# Patient Record
Sex: FEMALE | Race: WHITE | URBAN - METROPOLITAN AREA
[De-identification: names, ages, dates, MRNs, and addresses within clinical notes are randomized per-mention and may not be internally consistent; named-entity substitution may affect disease eponyms.]

---

## 2019-06-04 ENCOUNTER — NEW PATIENT (OUTPATIENT)
Dept: URBAN - METROPOLITAN AREA CLINIC 30 | Facility: CLINIC | Age: 79
End: 2019-06-04
Payer: MEDICARE

## 2019-06-04 DIAGNOSIS — M06.9 RHEUMATOID ARTHRITIS, UNSPECIFIED: Primary | ICD-10-CM

## 2019-06-04 PROCEDURE — 92134 CPTRZ OPH DX IMG PST SGM RTA: CPT | Performed by: OPTOMETRIST

## 2019-06-04 PROCEDURE — 92004 COMPRE OPH EXAM NEW PT 1/>: CPT | Performed by: OPTOMETRIST

## 2019-06-04 RX ORDER — CYCLOSPORINE 0.5 MG/ML
0.05 % EMULSION OPHTHALMIC
Qty: 60 | Refills: 5 | Status: INACTIVE
Start: 2019-06-04 | End: 2020-07-17

## 2019-06-04 ASSESSMENT — INTRAOCULAR PRESSURE
OS: 10
OD: 10

## 2019-06-04 ASSESSMENT — VISUAL ACUITY
OS: 20/25
OD: 20/20

## 2019-06-04 ASSESSMENT — KERATOMETRY
OS: 43.95
OD: 43.92

## 2020-10-28 ENCOUNTER — FOLLOW UP ESTABLISHED (OUTPATIENT)
Dept: URBAN - METROPOLITAN AREA CLINIC 30 | Facility: CLINIC | Age: 80
End: 2020-10-28
Payer: MEDICARE

## 2020-10-28 PROCEDURE — 92014 COMPRE OPH EXAM EST PT 1/>: CPT | Performed by: OPTOMETRIST

## 2020-10-28 PROCEDURE — 92083 EXTENDED VISUAL FIELD XM: CPT | Performed by: OPTOMETRIST

## 2020-10-28 PROCEDURE — 92134 CPTRZ OPH DX IMG PST SGM RTA: CPT | Performed by: OPTOMETRIST

## 2020-10-28 ASSESSMENT — INTRAOCULAR PRESSURE
OD: 11
OS: 10

## 2020-10-28 ASSESSMENT — VISUAL ACUITY
OD: 20/20
OS: 20/25

## 2020-10-28 ASSESSMENT — KERATOMETRY
OS: 43.98
OD: 43.95

## 2021-12-27 ENCOUNTER — OFFICE VISIT (OUTPATIENT)
Dept: URBAN - METROPOLITAN AREA CLINIC 30 | Facility: CLINIC | Age: 81
End: 2021-12-27
Payer: MEDICARE

## 2021-12-27 DIAGNOSIS — H35.3131 BILATERAL NONEXUDATIVE AGE-RELATED MACULAR DEGENERATION, EARLY DRY STAGE: ICD-10-CM

## 2021-12-27 DIAGNOSIS — G35 MULTIPLE SCLEROSIS: ICD-10-CM

## 2021-12-27 DIAGNOSIS — H16.223 KERATOCONJUNCTIVITIS SICCA, NOT SPECIFIED AS SJOGREN'S, BILATERAL: ICD-10-CM

## 2021-12-27 DIAGNOSIS — Z96.1 PRESENCE OF INTRAOCULAR LENS: ICD-10-CM

## 2021-12-27 DIAGNOSIS — H43.813 VITREOUS DEGENERATION, BILATERAL: ICD-10-CM

## 2021-12-27 DIAGNOSIS — Z79.899 OTHER LONG TERM (CURRENT) DRUG THERAPY: Primary | ICD-10-CM

## 2021-12-27 PROCEDURE — 92014 COMPRE OPH EXAM EST PT 1/>: CPT | Performed by: OPTOMETRIST

## 2021-12-27 PROCEDURE — 92134 CPTRZ OPH DX IMG PST SGM RTA: CPT | Performed by: OPTOMETRIST

## 2021-12-27 PROCEDURE — 92083 EXTENDED VISUAL FIELD XM: CPT | Performed by: OPTOMETRIST

## 2021-12-27 ASSESSMENT — KERATOMETRY
OD: 43.81
OS: 43.95

## 2021-12-27 ASSESSMENT — INTRAOCULAR PRESSURE
OS: 11
OD: 11

## 2021-12-27 ASSESSMENT — VISUAL ACUITY
OS: 20/20
OD: 20/25

## 2021-12-27 NOTE — IMPRESSION/PLAN
Impression: Keratoconjunctivitis sicca, bilateral Plan: On Restasis since 2017. Has been helpful per pt, she is comfortable using Restasis BID OU. Does not use ATs. Restasis Rx renewed today.

## 2021-12-27 NOTE — IMPRESSION/PLAN
Impression: Bilateral nonexudative age-related macular degeneration, early dry stage: H35.3131. Denies Fam Hx Plan: Pt educ on findings. Rec AREDs 2 vitamins, sun protection, leafy greens, etc. Non smoker-never. No signs of exudation on exam or OCT. Pt instructed to contact us immediately if notices decline in vision.

## 2021-12-27 NOTE — IMPRESSION/PLAN
Impression: Other long term (current) drug therapy Plan: Plaquenil 200 mg BID QD PO  since 2017 for RA. Moved from [place] 2019. MAC OCT 6/19-mild outer retinal degen, serves as baseline. MAC 10/20 no toxicity, stable RPE mottling OU. MAC OCT 12/21 no apparent toxicity (mild dry AMD) 10-2 VF 10/20 essentially full OU. VF 12/21 scattered defects OU (pt notes she was tired during the test)-may be impacted by AMD
NO toxicity,- will repeat in 3 months. Monitor annually.

## 2021-12-27 NOTE — IMPRESSION/PLAN
Impression: Multiple sclerosis Plan: Diagnosed 2002. On Copaxone. Notes h/o diplopia at onset (triple vision). No diplopia or triple vision since was diagnosed and tx was started. RNFL 6/2019 OD) normal NFL OD) borderline NFL.

## 2021-12-27 NOTE — IMPRESSION/PLAN
Impression: Vitreous degeneration, bilateral Plan: Retinas flat and attached OU. Reviewed signs and symptoms of RD and to call asap if occurs.

## 2022-03-30 ENCOUNTER — OFFICE VISIT (OUTPATIENT)
Dept: URBAN - METROPOLITAN AREA CLINIC 30 | Facility: CLINIC | Age: 82
End: 2022-03-30
Payer: MEDICARE

## 2022-03-30 PROCEDURE — 99213 OFFICE O/P EST LOW 20 MIN: CPT | Performed by: OPTOMETRIST

## 2022-03-30 PROCEDURE — 92083 EXTENDED VISUAL FIELD XM: CPT | Performed by: OPTOMETRIST

## 2022-03-30 RX ORDER — CYCLOSPORINE 0.5 MG/ML
0.05 % EMULSION OPHTHALMIC
Qty: 60 | Refills: 5 | Status: INACTIVE
Start: 2022-03-30 | End: 2022-03-30

## 2022-03-30 RX ORDER — CYCLOSPORINE 0.5 MG/ML
0.05 % EMULSION OPHTHALMIC
Qty: 60 | Refills: 5 | Status: ACTIVE
Start: 2022-03-30

## 2022-03-30 ASSESSMENT — INTRAOCULAR PRESSURE
OS: 14
OD: 13

## 2022-03-30 NOTE — IMPRESSION/PLAN
Impression: Other long term (current) drug therapy Plan: Plaquenil 200 mg BID QD PO  since 2017 for RA. Moved from [place] 2019. MAC OCT 6/19-mild outer retinal degen, serves as baseline. MAC 10/20 no toxicity, stable RPE mottling OU. MAC OCT 12/21 no apparent toxicity (mild dry AMD) 10-2 VF 10/20 essentially full OU. VF 12/21 scattered defects OU (pt notes she was tired during the test)-may be impacted by AMD
NO toxicity. VF 3/2022: essentially full OU. Monitor annually.

## 2022-03-30 NOTE — IMPRESSION/PLAN
Impression: Keratoconjunctivitis sicca, bilateral Plan: On Restasis since 2017. Has been helpful per pt, she is comfortable using Restasis BID OU. Does not use ATs. Restasis Rx renewed again today.

## 2023-02-02 ENCOUNTER — OFFICE VISIT (OUTPATIENT)
Dept: URBAN - METROPOLITAN AREA CLINIC 30 | Facility: CLINIC | Age: 83
End: 2023-02-02
Payer: COMMERCIAL

## 2023-02-02 DIAGNOSIS — Z96.1 PRESENCE OF INTRAOCULAR LENS: ICD-10-CM

## 2023-02-02 DIAGNOSIS — H35.3131 BILATERAL NONEXUDATIVE AGE-RELATED MACULAR DEGENERATION, EARLY DRY STAGE: ICD-10-CM

## 2023-02-02 DIAGNOSIS — Z79.899 OTHER LONG TERM (CURRENT) DRUG THERAPY: Primary | ICD-10-CM

## 2023-02-02 DIAGNOSIS — H43.813 VITREOUS DEGENERATION, BILATERAL: ICD-10-CM

## 2023-02-02 DIAGNOSIS — G35 MULTIPLE SCLEROSIS: ICD-10-CM

## 2023-02-02 DIAGNOSIS — H16.223 KERATOCONJUNCTIVITIS SICCA, NOT SPECIFIED AS SJOGREN'S, BILATERAL: ICD-10-CM

## 2023-02-02 PROCEDURE — 99213 OFFICE O/P EST LOW 20 MIN: CPT | Performed by: OPTOMETRIST

## 2023-02-02 PROCEDURE — 92083 EXTENDED VISUAL FIELD XM: CPT | Performed by: OPTOMETRIST

## 2023-02-02 PROCEDURE — 92134 CPTRZ OPH DX IMG PST SGM RTA: CPT | Performed by: OPTOMETRIST

## 2023-02-02 ASSESSMENT — KERATOMETRY
OS: 43.87
OD: 43.77

## 2023-02-02 ASSESSMENT — INTRAOCULAR PRESSURE
OS: 14
OD: 14

## 2023-02-02 ASSESSMENT — VISUAL ACUITY
OS: 20/20
OD: 20/20

## 2023-02-02 NOTE — IMPRESSION/PLAN
Impression: Keratoconjunctivitis sicca, bilateral Plan: On Restasis since 2017. Has been helpful per pt, she is comfortable using Restasis BID OU and ATs QD OU, in between Restasis. Pt notes overall comfort. Can add sera or consider short term steroid if has flare.

## 2023-02-02 NOTE — IMPRESSION/PLAN
Impression: Other long term (current) drug therapy Plan: Plaquenil 200 mg BID QD PO since 2017 for RA. Moved from [place] 2019. MAC OCT 6/19-mild outer retinal degen, serves as baseline. MAC 10/20 no toxicity, stable RPE mottling OU. MAC OCT 12/21 no apparent toxicity (mild dry AMD). MAC OCT 2/2023: AMD, no apparent toxicity 10-2 VF 10/20 essentially full OU. VF 12/21 scattered defects OU (pt notes she was tired during the test)-may be impacted by AMD
NO toxicity. VF 3/2022: essentially full OU. VF 2/2023:  unreliable/generally full. Monitor annually. Pt did try to go off the Plaquenil and had a lot of pain after 2 weeks, resumed.

## 2023-02-02 NOTE — IMPRESSION/PLAN
Impression: Bilateral nonexudative age-related macular degeneration, early dry stage: H35.3131. +Fam Hx- mother Plan: Previously recommended AREDs 2 vitamins. Pt is currently taking Healthy Eyes vitamins, discussed does not have the recommended level of lutein; rec [place] AREDS 2 when done with other vitamins. Rec sun protection, leafy greens, etc. Non smoker-never. No signs of exudation on exam or OCT. Pt instructed to contact us immediately if notices decline in vision.

## 2023-10-30 ENCOUNTER — OFFICE VISIT (OUTPATIENT)
Dept: URBAN - METROPOLITAN AREA CLINIC 30 | Facility: CLINIC | Age: 83
End: 2023-10-30
Payer: COMMERCIAL

## 2023-10-30 DIAGNOSIS — G35 MULTIPLE SCLEROSIS: ICD-10-CM

## 2023-10-30 DIAGNOSIS — Z79.899 OTHER LONG TERM (CURRENT) DRUG THERAPY: Primary | ICD-10-CM

## 2023-10-30 DIAGNOSIS — H43.813 VITREOUS DEGENERATION, BILATERAL: ICD-10-CM

## 2023-10-30 DIAGNOSIS — H16.223 KERATOCONJUNCTIVITIS SICCA, NOT SPECIFIED AS SJOGREN'S, BILATERAL: ICD-10-CM

## 2023-10-30 DIAGNOSIS — Z96.1 PRESENCE OF INTRAOCULAR LENS: ICD-10-CM

## 2023-10-30 DIAGNOSIS — H35.3131 BILATERAL NONEXUDATIVE AGE-RELATED MACULAR DEGENERATION, EARLY DRY STAGE: ICD-10-CM

## 2023-10-30 PROCEDURE — 92014 COMPRE OPH EXAM EST PT 1/>: CPT | Performed by: OPTOMETRIST

## 2023-10-30 PROCEDURE — 92083 EXTENDED VISUAL FIELD XM: CPT | Performed by: OPTOMETRIST

## 2023-10-30 PROCEDURE — 92134 CPTRZ OPH DX IMG PST SGM RTA: CPT | Performed by: OPTOMETRIST

## 2023-10-30 RX ORDER — VIT E/DHA/LUT/ZEAX/ASTAX/BILB 25-220-5
CAPSULE ORAL
Qty: 120 | Refills: 5 | Status: ACTIVE
Start: 2023-10-30

## 2023-10-30 ASSESSMENT — VISUAL ACUITY
OD: 20/20
OS: 20/20

## 2023-10-30 ASSESSMENT — INTRAOCULAR PRESSURE
OS: 15
OD: 15

## 2023-10-30 ASSESSMENT — KERATOMETRY
OS: 43.95
OD: 43.78

## 2025-03-20 ENCOUNTER — OFFICE VISIT (OUTPATIENT)
Dept: URBAN - METROPOLITAN AREA CLINIC 30 | Facility: CLINIC | Age: 85
End: 2025-03-20
Payer: MEDICARE

## 2025-03-20 DIAGNOSIS — G35 MULTIPLE SCLEROSIS: ICD-10-CM

## 2025-03-20 DIAGNOSIS — H16.223 KERATOCONJUNCTIVITIS SICCA, NOT SPECIFIED AS SJOGREN'S, BILATERAL: ICD-10-CM

## 2025-03-20 DIAGNOSIS — Z96.1 PRESENCE OF INTRAOCULAR LENS: ICD-10-CM

## 2025-03-20 DIAGNOSIS — Z79.899 OTHER LONG TERM (CURRENT) DRUG THERAPY: ICD-10-CM

## 2025-03-20 DIAGNOSIS — H35.3131 NONEXUDATIVE AGE-RELATED MACULAR DEGENERATION, BILATERAL, EARLY DRY STAGE: Primary | ICD-10-CM

## 2025-03-20 DIAGNOSIS — H43.813 VITREOUS DEGENERATION, BILATERAL: ICD-10-CM

## 2025-03-20 PROCEDURE — 92014 COMPRE OPH EXAM EST PT 1/>: CPT | Performed by: OPTOMETRIST

## 2025-03-20 PROCEDURE — 92134 CPTRZ OPH DX IMG PST SGM RTA: CPT | Performed by: OPTOMETRIST

## 2025-03-20 PROCEDURE — 92083 EXTENDED VISUAL FIELD XM: CPT | Performed by: OPTOMETRIST

## 2025-03-20 RX ORDER — CYCLOSPORINE 0.5 MG/ML
0.05 % EMULSION OPHTHALMIC
Qty: 180 | Refills: 2 | Status: ACTIVE
Start: 2025-03-20

## 2025-03-20 ASSESSMENT — VISUAL ACUITY
OD: 20/20
OS: 20/20

## 2025-03-20 ASSESSMENT — INTRAOCULAR PRESSURE
OS: 15
OD: 15

## 2025-03-20 ASSESSMENT — KERATOMETRY
OD: 43.75
OS: 240.50